# Patient Record
Sex: FEMALE | Race: OTHER | NOT HISPANIC OR LATINO | URBAN - METROPOLITAN AREA
[De-identification: names, ages, dates, MRNs, and addresses within clinical notes are randomized per-mention and may not be internally consistent; named-entity substitution may affect disease eponyms.]

---

## 2019-07-10 ENCOUNTER — EMERGENCY (EMERGENCY)
Facility: HOSPITAL | Age: 59
LOS: 1 days | Discharge: HOME | End: 2019-07-10
Attending: EMERGENCY MEDICINE | Admitting: EMERGENCY MEDICINE
Payer: COMMERCIAL

## 2019-07-10 VITALS
HEIGHT: 67 IN | DIASTOLIC BLOOD PRESSURE: 92 MMHG | HEART RATE: 91 BPM | TEMPERATURE: 98 F | OXYGEN SATURATION: 94 % | RESPIRATION RATE: 22 BRPM | WEIGHT: 199.96 LBS | SYSTOLIC BLOOD PRESSURE: 162 MMHG

## 2019-07-10 DIAGNOSIS — J45.909 UNSPECIFIED ASTHMA, UNCOMPLICATED: ICD-10-CM

## 2019-07-10 DIAGNOSIS — J45.21 MILD INTERMITTENT ASTHMA WITH (ACUTE) EXACERBATION: ICD-10-CM

## 2019-07-10 PROCEDURE — 99284 EMERGENCY DEPT VISIT MOD MDM: CPT

## 2019-07-10 RX ORDER — ALBUTEROL 90 UG/1
2.5 AEROSOL, METERED ORAL ONCE
Refills: 0 | Status: COMPLETED | OUTPATIENT
Start: 2019-07-10 | End: 2019-07-10

## 2019-07-10 RX ADMIN — ALBUTEROL 2.5 MILLIGRAM(S): 90 AEROSOL, METERED ORAL at 23:51

## 2019-07-10 NOTE — ED ADULT NURSE NOTE - OBJECTIVE STATEMENT
c/o asthma.  c/o sob all day today despite using inhalers.  no nebulizer at home.  no past intubations

## 2019-07-10 NOTE — ED ADULT NURSE NOTE - NSIMPLEMENTINTERV_GEN_ALL_ED
Implemented All Universal Safety Interventions:  Criders to call system. Call bell, personal items and telephone within reach. Instruct patient to call for assistance. Room bathroom lighting operational. Non-slip footwear when patient is off stretcher. Physically safe environment: no spills, clutter or unnecessary equipment. Stretcher in lowest position, wheels locked, appropriate side rails in place.

## 2019-07-11 VITALS — HEART RATE: 88 BPM | OXYGEN SATURATION: 92 % | RESPIRATION RATE: 20 BRPM

## 2019-07-11 PROCEDURE — 99283 EMERGENCY DEPT VISIT LOW MDM: CPT | Mod: 25

## 2019-07-11 PROCEDURE — 94640 AIRWAY INHALATION TREATMENT: CPT

## 2019-07-11 RX ADMIN — Medication 50 MILLIGRAM(S): at 00:02

## 2019-07-11 NOTE — ED PROVIDER NOTE - EYES, MLM
After Visit Summary   4/11/2017    Jorge A Jimenez    MRN: [id number]           Patient Information     Date Of Birth          1975        Visit Information        Provider Department      4/11/2017 8:20 AM Aiyana Parker MD [facility]        Today's Diagnoses     Increased severity of headaches    -  1      Care Instructions    AFTER VISIT SUMMARY (AVS)  Orders Placed This Encounter   Procedures     MRA Angiogram Head w/o Contrast       Signed Prescriptions:                        Disp   Refills    verapamil (CALAN) 40 MG tablet             270 ta*1        Sig: Week 1: 40 mg evening. Week 2: 40 mg two times/day.           Week 3 and afterwards: 40 mg three times/day.  Authorizing Provider: AIYANA PARKER    SUMAtriptan (IMITREX) 20 MG/ACT nasal spray12 each5        Sig: Spray in nostril on opposite side of headache at           onset. May repeat in 2 hours. Max 2 sprays/24           hours.  Authorizing Provider: AIYANA PARKER      Diagnostic possibilities reviewed and reasons for work-up explained    Preventive Neurology: Encouraged to keep physically and mentally active with particular emphasis on daily stretching exercises, walking, and healthy eating.    Additional  recommendations after the work-up    To call us for follow-up appointment after the work-up    Thanks  ----------------------------------------------------------------------------------------------------------------------                  Follow-ups after your visit        Follow-up notes from your care team     Return in about 6 weeks (around 5/23/2017).      Future tests that were ordered for you today     Open Future Orders        Priority Expected Expires Ordered    MRA Angiogram Head w/o Contrast Routine  4/12/2018 4/11/2017            Who to contact     If you have questions or need follow up information about today's clinic visit or your schedule please contact CLAUDIA ROSA directly at  "[phone].  Normal or non-critical lab and imaging results will be communicated to you by MyChart, letter or phone within 4 business days after the clinic has received the results. If you do not hear from us within 7 days, please contact the clinic through [employer] or phone. If you have a critical or abnormal lab result, we will notify you by phone as soon as possible.  Submit refill requests through [employer] or call your pharmacy and they will forward the refill request to us. Please allow 3 business days for your refill to be completed.          Additional Information About Your Visit        [employer] Information     [employer] lets you send messages to your doctor, view your test results, renew your prescriptions, schedule appointments and more. To sign up, go to [email or web address] . Click on \"Log in\" on the left side of the screen, which will take you to the Welcome page. Then click on \"Sign up Now\" on the right side of the page.     You will be asked to enter the access code listed below, as well as some personal information. Please follow the directions to create your username and password.     Your access code is: [email or web address]  Expires: 2017  1:37 PM     Your access code will  in 90 days. If you need help or a new code, please call your [facility] clinic or [phone].        Care EveryWhere ID     This is your Care EveryWhere ID. This could be used by other organizations to access your [facility] medical records  [id number]        Your Vitals Were     Pulse Height BMI (Body Mass Index)             73 1.816 m (5' 11.5\") 43.35 kg/m2          Blood Pressure from Last 3 Encounters:   17 139/90   17 124/80    Weight from Last 3 Encounters:   17 (!) 143 kg (315 lb 3.2 oz)   17 (!) 144.2 kg (318 lb)                 Today's Medication Changes          These changes are accurate as of: 17  9:29 AM.  If you have any questions, ask your nurse or doctor.               Start taking " these medicines.        Dose/Directions    verapamil 40 MG tablet   Commonly known as:  CALAN   Used for:  Increased severity of headaches   Started by:  Aiyana Parker MD        Week 1: 40 mg evening. Week 2: 40 mg two times/day. Week 3 and afterwards: 40 mg three times/day.   Quantity:  270 tablet   Refills:  1            Where to get your medicines      These medications were sent to [facility] [address] [place] [address] [address] AT [place] [address] [address], [facility] [address]    Hours:  24-hours Phone:  [phone]     SUMAtriptan 20 MG/ACT nasal spray    verapamil 40 MG tablet                Primary Care Provider Office Phone # Fax #    Imani Gudino JESSE [facility] [phone]       [address] [facility] [address]        Thank you!     Thank you for choosing [facility]  for your care. Our goal is always to provide you with excellent care. Hearing back from our patients is one way we can continue to improve our services. Please take a few minutes to complete the written survey that you may receive in the mail after your visit with us. Thank you!             Your Updated Medication List - Protect others around you: Learn how to safely use, store and throw away your medicines at www.disposemymeds.org.          This list is accurate as of: 4/11/17  9:29 AM.  Always use your most recent med list.                   Brand Name Dispense Instructions for use    acetaminophen-codeine 300-30 MG per tablet    TYLENOL #3    20 tablet    Take 2 tablets by mouth every 6 hours as needed (headache) Maximum 4/day       SUMAtriptan 20 MG/ACT nasal spray    IMITREX    12 each    Spray in nostril on opposite side of headache at onset. May repeat in 2 hours. Max 2 sprays/24 hours.       verapamil 40 MG tablet    CALAN    270 tablet    Week 1: 40 mg evening. Week 2: 40 mg two times/day. Week 3 and afterwards: 40  mg three times/day.          Clear bilaterally, pupils equal, round and reactive to light.

## 2019-07-11 NOTE — ED PROVIDER NOTE - OBJECTIVE STATEMENT
59 F co asthma- chest tightness x 1 day- the air felt humid and thick today 59 F co asthma- chest tightness x 1 day- the air felt humid and thick today and it gave her an asthma attack- chest tightness and sob- sx improved after neb- not sig improved after inhaler  no f/c no n/v

## 2019-07-11 NOTE — ED PROVIDER NOTE - CLINICAL SUMMARY MEDICAL DECISION MAKING FREE TEXT BOX
air movement improved markedly w nebs x 2 , po steroids- px feeling better, wants to go home- stable for d/c

## 2019-11-11 ENCOUNTER — EMERGENCY (EMERGENCY)
Facility: HOSPITAL | Age: 59
LOS: 1 days | Discharge: HOME | End: 2019-11-11
Attending: EMERGENCY MEDICINE | Admitting: EMERGENCY MEDICINE
Payer: COMMERCIAL

## 2019-11-11 VITALS
OXYGEN SATURATION: 90 % | SYSTOLIC BLOOD PRESSURE: 174 MMHG | DIASTOLIC BLOOD PRESSURE: 101 MMHG | RESPIRATION RATE: 18 BRPM | HEART RATE: 91 BPM | TEMPERATURE: 98 F

## 2019-11-11 DIAGNOSIS — R06.02 SHORTNESS OF BREATH: ICD-10-CM

## 2019-11-11 DIAGNOSIS — Z79.899 OTHER LONG TERM (CURRENT) DRUG THERAPY: ICD-10-CM

## 2019-11-11 DIAGNOSIS — Z79.52 LONG TERM (CURRENT) USE OF SYSTEMIC STEROIDS: ICD-10-CM

## 2019-11-11 DIAGNOSIS — J45.901 UNSPECIFIED ASTHMA WITH (ACUTE) EXACERBATION: ICD-10-CM

## 2019-11-11 PROCEDURE — 71045 X-RAY EXAM CHEST 1 VIEW: CPT | Mod: 26

## 2019-11-11 PROCEDURE — 99285 EMERGENCY DEPT VISIT HI MDM: CPT

## 2019-11-11 PROCEDURE — 93010 ELECTROCARDIOGRAM REPORT: CPT

## 2019-11-11 RX ORDER — FEXOFENADINE HCL 30 MG
0 TABLET ORAL
Qty: 0 | Refills: 0 | DISCHARGE

## 2019-11-11 RX ORDER — SODIUM CHLORIDE 9 MG/ML
1000 INJECTION INTRAMUSCULAR; INTRAVENOUS; SUBCUTANEOUS ONCE
Refills: 0 | Status: COMPLETED | OUTPATIENT
Start: 2019-11-11 | End: 2019-11-11

## 2019-11-11 RX ORDER — ALBUTEROL 90 UG/1
0 AEROSOL, METERED ORAL
Qty: 0 | Refills: 0 | DISCHARGE

## 2019-11-11 RX ORDER — FLUTICASONE PROPIONATE 220 MCG
0 AEROSOL WITH ADAPTER (GRAM) INHALATION
Qty: 0 | Refills: 0 | DISCHARGE

## 2019-11-11 RX ORDER — IPRATROPIUM/ALBUTEROL SULFATE 18-103MCG
3 AEROSOL WITH ADAPTER (GRAM) INHALATION
Refills: 0 | Status: COMPLETED | OUTPATIENT
Start: 2019-11-11 | End: 2019-11-11

## 2019-11-11 RX ORDER — MAGNESIUM SULFATE 500 MG/ML
2 VIAL (ML) INJECTION ONCE
Refills: 0 | Status: COMPLETED | OUTPATIENT
Start: 2019-11-11 | End: 2019-11-11

## 2019-11-11 RX ADMIN — Medication 3 MILLILITER(S): at 22:30

## 2019-11-11 RX ADMIN — Medication 50 GRAM(S): at 23:20

## 2019-11-11 RX ADMIN — Medication 3 MILLILITER(S): at 23:21

## 2019-11-11 RX ADMIN — Medication 3 MILLILITER(S): at 22:00

## 2019-11-11 RX ADMIN — Medication 60 MILLIGRAM(S): at 23:20

## 2019-11-11 RX ADMIN — SODIUM CHLORIDE 2000 MILLILITER(S): 9 INJECTION INTRAMUSCULAR; INTRAVENOUS; SUBCUTANEOUS at 23:20

## 2019-11-11 NOTE — ED PROVIDER NOTE - OBJECTIVE STATEMENT
60 y/o F with PMHx asthma, presents to the ED with SOB x 1 week, worsening today. Pt reports recent exposure to dust and mold in her apartment, which she endorses triggered her asthmatic symptoms. Pt has chronic cough with productive sputum only 1-2 times per day. Pt doesn't feel ill, but feels her asthma is acting up. Denies history of cardiac problems, history of PE/DVT, OCP/hormonal supplementation use, past surgeries or recent travel. Pt reports history of prior admissions for her asthma exacerbation (admitted to the hospital 3 times for her asthma in the past year), but no history of intubations. Pt reports receiving nebulizer on arrival in triage, which significantly relieved her symptoms. Denies chest pain or palpitations; no other acute complaints at this time. Nonsmoker.

## 2019-11-11 NOTE — ED PROVIDER NOTE - PATIENT PORTAL LINK FT
You can access the FollowMyHealth Patient Portal offered by [facility] by registering at the following website: [email or web address]. By joining [facility]’s FollowMyHealth portal, you will also be able to view your health information using other applications (apps) compatible with our system.

## 2019-11-11 NOTE — ED PROVIDER NOTE - PROGRESS NOTE DETAILS
Jose R: received s/o pending CTA. CT no acute pathology. Feeling much better, even w/ ambulating. Comfortable for dc, outpt pmd f/u.

## 2019-11-11 NOTE — ED PROVIDER NOTE - CLINICAL SUMMARY MEDICAL DECISION MAKING FREE TEXT BOX
60 y/o F with known asthma presents with SOB, peak flow 120 on arrival, consistent with usual asthma exacerbation. Will XR to r/o pneumonia, EKG evaluated for ACS but suspect less likely. Will give nebs, prednisone, and magnesium. If pt continues to improve, likely will DC on steroids with PCP f/u. If no significant improvement, will admit to medicine for IV steroids and further monitoring.

## 2019-11-12 VITALS
RESPIRATION RATE: 20 BRPM | TEMPERATURE: 98 F | OXYGEN SATURATION: 93 % | SYSTOLIC BLOOD PRESSURE: 130 MMHG | HEART RATE: 92 BPM | DIASTOLIC BLOOD PRESSURE: 82 MMHG

## 2019-11-12 PROBLEM — J45.909 UNSPECIFIED ASTHMA, UNCOMPLICATED: Chronic | Status: ACTIVE | Noted: 2019-07-10

## 2019-11-12 LAB
ANION GAP SERPL CALC-SCNC: 13 MMOL/L — SIGNIFICANT CHANGE UP (ref 5–17)
BASOPHILS # BLD AUTO: 0.06 K/UL — SIGNIFICANT CHANGE UP (ref 0–0.2)
BASOPHILS NFR BLD AUTO: 0.7 % — SIGNIFICANT CHANGE UP (ref 0–2)
BUN SERPL-MCNC: 11 MG/DL — SIGNIFICANT CHANGE UP (ref 7–23)
CALCIUM SERPL-MCNC: 9.4 MG/DL — SIGNIFICANT CHANGE UP (ref 8.4–10.5)
CHLORIDE SERPL-SCNC: 102 MMOL/L — SIGNIFICANT CHANGE UP (ref 96–108)
CO2 SERPL-SCNC: 26 MMOL/L — SIGNIFICANT CHANGE UP (ref 22–31)
CREAT SERPL-MCNC: 0.65 MG/DL — SIGNIFICANT CHANGE UP (ref 0.5–1.3)
EOSINOPHIL # BLD AUTO: 0.81 K/UL — HIGH (ref 0–0.5)
EOSINOPHIL NFR BLD AUTO: 9.9 % — HIGH (ref 0–6)
EXTRA BLUE TOP TUBE: SIGNIFICANT CHANGE UP
GLUCOSE SERPL-MCNC: 112 MG/DL — HIGH (ref 70–99)
HCT VFR BLD CALC: 42.5 % — SIGNIFICANT CHANGE UP (ref 34.5–45)
HGB BLD-MCNC: 13.5 G/DL — SIGNIFICANT CHANGE UP (ref 11.5–15.5)
IMM GRANULOCYTES NFR BLD AUTO: 0.2 % — SIGNIFICANT CHANGE UP (ref 0–1.5)
LYMPHOCYTES # BLD AUTO: 1.84 K/UL — SIGNIFICANT CHANGE UP (ref 1–3.3)
LYMPHOCYTES # BLD AUTO: 22.5 % — SIGNIFICANT CHANGE UP (ref 13–44)
MCHC RBC-ENTMCNC: 28.5 PG — SIGNIFICANT CHANGE UP (ref 27–34)
MCHC RBC-ENTMCNC: 31.8 GM/DL — LOW (ref 32–36)
MCV RBC AUTO: 89.7 FL — SIGNIFICANT CHANGE UP (ref 80–100)
MONOCYTES # BLD AUTO: 0.53 K/UL — SIGNIFICANT CHANGE UP (ref 0–0.9)
MONOCYTES NFR BLD AUTO: 6.5 % — SIGNIFICANT CHANGE UP (ref 2–14)
NEUTROPHILS # BLD AUTO: 4.9 K/UL — SIGNIFICANT CHANGE UP (ref 1.8–7.4)
NEUTROPHILS NFR BLD AUTO: 60.2 % — SIGNIFICANT CHANGE UP (ref 43–77)
NRBC # BLD: 0 /100 WBCS — SIGNIFICANT CHANGE UP (ref 0–0)
PLATELET # BLD AUTO: 307 K/UL — SIGNIFICANT CHANGE UP (ref 150–400)
POTASSIUM SERPL-MCNC: 3.1 MMOL/L — LOW (ref 3.5–5.3)
POTASSIUM SERPL-SCNC: 3.1 MMOL/L — LOW (ref 3.5–5.3)
RBC # BLD: 4.74 M/UL — SIGNIFICANT CHANGE UP (ref 3.8–5.2)
RBC # FLD: 13.6 % — SIGNIFICANT CHANGE UP (ref 10.3–14.5)
SODIUM SERPL-SCNC: 141 MMOL/L — SIGNIFICANT CHANGE UP (ref 135–145)
WBC # BLD: 8.16 K/UL — SIGNIFICANT CHANGE UP (ref 3.8–10.5)
WBC # FLD AUTO: 8.16 K/UL — SIGNIFICANT CHANGE UP (ref 3.8–10.5)

## 2019-11-12 PROCEDURE — 85025 COMPLETE CBC W/AUTO DIFF WBC: CPT

## 2019-11-12 PROCEDURE — 96374 THER/PROPH/DIAG INJ IV PUSH: CPT | Mod: XU

## 2019-11-12 PROCEDURE — 93005 ELECTROCARDIOGRAM TRACING: CPT

## 2019-11-12 PROCEDURE — 71275 CT ANGIOGRAPHY CHEST: CPT | Mod: 26

## 2019-11-12 PROCEDURE — 36415 COLL VENOUS BLD VENIPUNCTURE: CPT

## 2019-11-12 PROCEDURE — 80048 BASIC METABOLIC PNL TOTAL CA: CPT

## 2019-11-12 PROCEDURE — 71045 X-RAY EXAM CHEST 1 VIEW: CPT

## 2019-11-12 PROCEDURE — 71275 CT ANGIOGRAPHY CHEST: CPT

## 2019-11-12 PROCEDURE — 99285 EMERGENCY DEPT VISIT HI MDM: CPT | Mod: 25

## 2019-11-12 PROCEDURE — 94640 AIRWAY INHALATION TREATMENT: CPT

## 2022-08-30 NOTE — ED PROVIDER NOTE - NS ED ATTENDING STATEMENT MOD
Instructions: This plan will send the code FBSD to the PM system.  DO NOT or CHANGE the price.
Price (Do Not Change): 0.00
Attending Only
Detail Level: Simple

## 2023-04-04 NOTE — ED ADULT NURSE NOTE - CAS EDN DISCHARGE ASSESSMENT
[facility]  Otolaryngology - Head/Neck Surgery Department  Operative Note       Date of Procedure: 4/4/2023     Pre-Operative Diagnosis:   Chronic pansinusitis [J32.4]  History of endoscopic sinus surgery [Z98.890]    Post-Operative Diagnosis:  Chronic pansinusitis [J32.4]  History of endoscopic sinus surgery [Z98.890]    Procedure:   [place] Sinus FESS, WITH IMAGING GUIDANCE Disc loaded (Bilateral)    (1) Bilateral total sphenoethmoidectomy, endoscopic  (2) Bilateral maxillary antrostomy w/o removal of contents, endoscopic  (3) Bilateral frontal sinus exploration w/o removal of contents, endoscopic  (4) Use of computer image guidance, cranial, extradural    Surgeon(s):  Jay Hernandez MD    Anesthesia: General  Anesthesiologist: Saúl Goff MD  CRNA: Samantha Montana CRNA    Surgeon: Jay Hernandez MD FACS    Assistant: n/a    Estimated Blood Loss (EBL): < 100 ml           Implants: * No implants in log *    Specimens:  Specimens (From admission, onward)      None        N/A      Complications: None    Indication:   56 y.o. female with chronic pansinusitis with 2 prior balloon dilation sinuplasty with nasal polyposis refractory to medical therapy.     Consent was signed by patient (guardian) after all risks, benefits, limitations and alternatives to surgery were discussed in detail and legal consent executed and read at time out in the room.    Findings:     Grade 1 polyposis throughout the nasal cavities specifically in the middle meatus and ethmoids.  Polypoid edema of the frontal recesses without definitive polyps.  No sphenoethmoid recess or medial compartment polyps.  Remarkable amount of inflammation and bony sclerosis of the ethmoids.  No specifically suspicious tissue all polypoid bland edematous tissue and inflamed tissue. No gross purulence or fungal debris.  No skull base defect, CSF leak, arterial bleeding or herniation of orbital/contents.    Procedure in Detail:     After informed consent  was obtained in which all risks, benefits, limitations and alternatives were discussed patient was brought to the operating room and placed supine on the operating table.      General endotracheal anesthesia was induced and maintained by anesthesia without complication or and time out was performed.  Difficulty    Patient was registered into the image guidance system with good return of landmarks after draping in the standard fashion with bio occlusive dressings over the eyes to allow for palpation during surgery.    Nasal mucosa was decongested bilaterally with cotton pledgets with epinephrine 1:1000 into the middle meatus gently medializing the middle turbinates.  Injection of 1% lidocaine with epinephrine then proceeded into the uncinate process septum middle turbinate and the sphenoethmoid face and posterior attachment of the middle turbinate for blockade of the sphenopalatine artery complex.      Beginning with the right side the middle turbinate was gently medialized the uncinate process identified and inwardly and downwardly outfractured with a maxillary seeker and debrided with the micro debrider widening the maxillary os into the posterior aspect of the middle meatus.  The ethmoid bulla was collapsed and debrided and the basal lamella was transgressed and opened to the face of the sphenoid which was entered and widened at its natural os identifying the skull base from posterior to anterior partitions were cleared with curettage and micro debrider taking care not to damage the posterior and anterior ethmoid arteries.  The agger Nasi cell was then taken down collapse and its posterior wall fractured forward debriding it into a wider opening of the frontal outflow.  Frontal outflow was widened by taking down supra volar cells and the posterior aspect of the agger Nasi but still narrow and quite edematous and was thereby dilated further both high and low with the Acclarent balloon dilation system with  confirmation of frontal sinus entry with the light wire and overlapping dilations of the 6 X 20 mm balloon.    The above was repeated on the contralateral left side.      PosiSept dissolvable packing was placed in both ethmoid defects/middle meatus maintaining middle turbinate medialization and up into the frontal recess to keep mucosa down and keep the frontal recess open.    All sponge count needle, and instrument counts were correct at the end of the procedure.    There were no known complications of surgery at the time of this operative note's creation.    Voice recognition software was used in the creation of this operative note, any sound alike areas which may have occurred should be taken in context when interpreting.       Condition: Good  Disposition: PACU - hemodynamically stable.  Attestation: I was present and scrubbed for the entire procedure.    Jay Jasonspatricia Dept of Otolaryngology - Head and Neck Surgery     Alert and oriented to person, place and time